# Patient Record
(demographics unavailable — no encounter records)

---

## 2019-02-16 NOTE — ASMTLCPROG
Notes

 

Note:                         

Notes:

PT was recently discharged from retirement but not given a prescription for the meds she was taking. Pt 

came for a refil and med adjustment. Per physician request, provided  out-patient resources.

 

Date Signed:  02/16/2019 11:32 PM

Electronically Signed By:Lamberto Hutchison

## 2019-02-16 NOTE — EDPHY
General





- History


Smoking Status: Never smoked


Time Seen by Provider: 02/16/19 18:30


Narrative: 





CLINICAL IMPRESSION:  Prescription refills, anxiety





ASSESSMENT/PLAN: 





Patient is a 26-year-old female with a history of PTSD and anxiety who presents 

to the emergency department requesting refills for medication that she was 

placed on while incarcerated.  Patient is afebrile, mildly anxious however not 

toxic-appearing.  Patient with no SI, no indication for M1 hold as she is here 

voluntarily.  [facility] visited with the patient and provided 

resources.  I discussed that we can refill a short-term amount of medications 

however she will need to establish care for her ongoing needs and possible need 

for change in medications.  In regards to her dental pain, she was noted to 

have very mild pericoronitis on her lower posterior molars, no evidence of 

periapical abscess or infectious process.  She was given her Wellbutrin and 

some ibuprofen in the emergency department and was given a short-term 

prescription for both her Wellbutrin and trazodone.  I have also provided a 

list of outpatient resources for primary care.  Conservative return precautions 

discussed-she will return for worsening dental pain, worsening anxiety, SI, 

difficulty swallowing or passing secretions or for any other concerning 

symptom.  Patient verbalized understanding and she is in agreement with this 

plan.


 


DIFFERENTIAL DX: 





Differential diagnosis including but not limited to depression, anxiety, SI, 

medication noncompliance, medication side effect





ED COURSE: 


1904:  Discussed case with Dr. Kahn, mental health full also evaluate the 

patient and provide resources.





CHIEF COMPLAINT:  Medication refill, anxiety 





HPI: 





Patient is a 26-year-old female with a history of PTSD and anxiety who presents 

to the emergency department requesting refills for medications that she was 

placed on while incarcerated.  Patient was recently incarcerated at the [facility], was started on Wellbutrin and trazodone 2 weeks prior.  She was 

discharged yesterday and was not provided a prescription for these medications.

  She checked into her prison house and they sent her here for evaluation and 

prescription refill.  Patient reports longstanding history of PTSD and anxiety, 

never has been on medications before.  She has been compliant with her 

medications up until yesterday as she was not provided a prescription.  She 

endorses that she feels that the medications are not really helping her, she is 

still feeling anxious and having somewhat difficulty with sleeping.  She denies 

significant insomnia, no history of SI or billie.  She denies any SI today.  

Patient has otherwise been feeling well, denies any recent illness.  She 

endorses longstanding history of lower dental pain and need for wisdom teeth 

removal.  She denies any increased pain, difficulty swallowing, redness or 

drainage.


_________________ 


PAST MEDICAL HISTORY:  PTSD, anxiety, difficulty sleeping 


Family History:  Noncontributory 


Social History:  Denies illicit drug use, alcohol or cigarette smoking 


_________________ 


ROS: 


A full 10 point review of systems was negative except for those mentioned in 

HPI. 


_________________ 


PHYSICAL EXAM: 


General Appearance:  The patient is well-appearing, mildly anxious however in 

no acute distress.


HEENT: 


Normocephalic, atraumatic.


External ears are normal.  Nares are clear, oropharynx is clear.  There is mild 

pericoronitis noted bilateral posterior molars; no evidence of periapical 

abscess, dental fracture/poor dentition or gingivitis.  Uvula is midline, there 

is no tonsillar enlargement or exudate.  Her phonation is normal and there is 

no stridor.


Eyes: PERRLA, EOMI intact. Conjunctiva pink, no pallor or injection. 


Neck: Supple, nontender, no lymphadenopathy, no midline pain, FROM, no 

meningismus. 


Respiratory: There are no retractions, lungs are clear to auscultation. 


Cardiac: Regular rate and rhythm, no murmurs or gallops. 


Gastrointestinal: Abdomen is soft, nontender, bowel sounds normal, no masses/

hernia, no rigidity, guarding or focal peritoneal findings. 


Skin: Warm, dry, no rashes, no nodules on palpation. 


Psych:  Normal affect, mildly anxious, not agitated.


_________________ 


MEDICAL DECISION MAKING: 


Patient was seen independently. Secondary supervising physician at time of 

evaluation was Dr. Kahn, I discussed this case with him however he did not 

evaluate this patient personally. 


Diagnosis:  Anxiety, medication refill. New, requires workup 


Summary: See Assessment and Plan for summary of ED visit  


Clinical lab tests:  Not applicable. 


Independent visualization of images, tracing, or specimens:  Not applicable. 


Decision to obtain medical records or history from someone other than the 

patient:  No


Review / Summarize previous medical records:  Yes, prison records 


Discussed patient with another provider:  Yes, Dr. Kahn 


Patient Progress:  Stable, discharged.  (Manda Kennedy)


Medical Decision Making: 





I did not see this patient while she was in the emergency department.  However 

her care was discussed with the PA while the patient was in the department.  I 

agree with treatment plan and management (Jono Kahn)





- Objective


Vital Signs: 


 Initial Vital Signs











Temperature (C)  36.9 C   02/16/19 18:04


 


Heart Rate  69   02/16/19 18:04


 


Respiratory Rate  16   02/16/19 18:04


 


Blood Pressure  124/86 H  02/16/19 18:04


 


O2 Sat (%)  95   02/16/19 18:04








 











O2 Delivery Mode               Room Air














Allergies/Adverse Reactions: 


 





No Known Allergies Allergy (Unverified 02/16/19 18:08)


 








Home Medications: 














 Medication  Instructions  Recorded


 


Wellbutrin 75mg (*)  02/16/19


 


buPROPion [Wellbutrin 75mg (*)] 75 mg PO BID #10 tab 02/16/19


 


traZODone  02/16/19


 


traZODone [traZODONE 100MG (*)] 100 mg PO HS PRN #5 tab 02/16/19











Medications Given: 


 








Discontinued Medications





Bupropion HCl (Wellbutrin)  75 mg PO ONCE ONE


   Stop: 02/16/19 19:48


   Last Admin: 02/16/19 20:01 Dose:  75 mg


Ibuprofen (Motrin)  400 mg PO EDNOW ONE


   Stop: 02/16/19 19:48


   Last Admin: 02/16/19 19:57 Dose:  400 mg








Departure





- Departure


Disposition: Home, Routine, Self-Care


Clinical Impression: 


 Anxiety, Medication refill





Condition: Good


Instructions:  Bupropion (By mouth), Trazodone (By mouth), Generalized Anxiety 

Disorder (ED)


Additional Instructions: 


DISCHARGE INSTRUCTIONS FROM YOUR DOCTOR 


Thank you for visiting our emergency department today. Please keep in mind that 

discharge from the emergency department does not mean that there is nothing 

wrong - it simply means that we have not identified an emergency condition that 

requires further evaluation or treatment in the hospital. You should always 

plan to follow up with primary care for re-evaluation of your condition in the 

next 2-3 days. 





You have been provided a short course refill of your medications, it is 

imperative that you establish care with Mental Health and a primary care 

provider for ongoing medication needs.





It is important that you follow up with a dentist. 





For pain control:


You may take Tylenol, I recommend 500-1000 mg every 6-8 hours as needed.  Take 

with food and a full glass of water.  Stop taking if this is upsetting her 

stomach.  Do not exceed 4000 mg in a 24 hr period.





You may also take ibuprofen, recommend 400 mg every 6 hr.  Take with food and a 

full glass of water.  Stop taking if this upsets her stomach.  Do not exceed 

2400 mg in a 24 hr period.





Rest, healthy/regular sleep schedule, push fluids, healthy diet, regular 

exercise.





Attempt to reduce stress. 





Pursue pleasurable, healthy activities.





Surround yourself with loving, supportive, healthy friends and family.





Stop smoking as soon as possible.





Avoid drugs and alcohol. 





Re-establish counseling to help work through issues and develop good coping and 

behavioral strategies for stress reduction and symptom control. 





Establish care with a primary care physician. See our list of resources. 





Return for increased or unmanageable anxiety, severe depression, thoughts or 

plans to hurt yourself or someone else, for chest pain, shortness of breath, 

dizziness, fainting, rapid or irregular heart beat, sweating, vomiting, 

abdominal pain, back pain, tremor, seizure, mental status changes, or for any 

other new, worsening or worrisome symptoms. 





People present with illnesses and injuries in different ways, and it is always 

possible that we have missed something. You may always return for re-evaluation 

if symptoms worsen or if they are not improving or if you develop new/different 

symptoms. 





Again, thank you for choosing our emergency department. We hope that you feel 

better.





The [facility] has walk-in appointments for the homeless at the following 

days/locations.  No appointment is needed.





Monday


8-10 am @ [facility]


11 AM-1 PM @ [facility]





Tuesday


8-10:30 AM @ [facility]





Wednesday


8-10 AM @ [facility]





Thursday


2-4 PM @ [facility]





Friday


8-10 AM @ [facility]


Referrals: 


NONE *PRIMARY CARE P,. [Primary Care Provider] - As per Instructions


Prescriptions: 


buPROPion [Wellbutrin 75mg (*)] 75 mg PO BID #10 tab


traZODone [traZODONE 100MG (*)] 100 mg PO HS PRN #5 tab


 PRN Reason: Sleep/Insomnia